# Patient Record
Sex: FEMALE | Race: WHITE | NOT HISPANIC OR LATINO | URBAN - METROPOLITAN AREA
[De-identification: names, ages, dates, MRNs, and addresses within clinical notes are randomized per-mention and may not be internally consistent; named-entity substitution may affect disease eponyms.]

---

## 2024-08-23 ENCOUNTER — TELEPHONE (OUTPATIENT)
Dept: ENDOCRINOLOGY | Facility: CLINIC | Age: 44
End: 2024-08-23
Payer: COMMERCIAL

## 2024-08-23 NOTE — TELEPHONE ENCOUNTER
Spoke with pt. Explained nothing available with dr sanders at this time. Dr sanders fully booked through December. Instructed pt to give us a call back on sept 1 to get scheduled in January when the new schedules are released. Pt verbalized understanding. Pt also added to wait list.

## 2024-08-23 NOTE — TELEPHONE ENCOUNTER
----- Message from Sandie Reynoso sent at 8/23/2024  9:15 AM CDT -----  Regarding: Appt ( 2nd attempt )  Contact: [phone]  ALEXEY MORENO calling regarding Appointment Access  (message) for # pt is calling again following up on a message that was sent last week to schedule and appt pls advise and call pt & add to waitlist